# Patient Record
(demographics unavailable — no encounter records)

---

## 2025-07-03 NOTE — HISTORY OF PRESENT ILLNESS
[TextBox_4] : MARIA T  is here for an initial consultation.  He was born at term after an unassisted conception and uneventful pregnancy.  Hydronephrosis was detected in utero at 20 weeks and remained stable through the rest of the pregnancy.  No other anomalies noted and the amniotic fluid levels were normal.  Post partum he has been well without any issues feeding or voiding.  No fevers or UTIs.   A renal ultrasound at birth was not obtained.   In addition, a deformity of the penis was detected in the nursery which prevented circumcision as . Making ample wet diapers.  No infections.

## 2025-07-03 NOTE — CONSULT LETTER
[FreeTextEntry1] : Dear Dr. COLLINS MARROQUIN ,  I had the pleasure of consulting on ASHLEETOMMY RIGGS today.  Below is my note regarding the office visit today.  Thank you so very much for allowing me to participate in ASHLEETOMMY's  care.  Please don't hesitate to call me should any questions or issues arise .  Sincerely,   Kwesi Price MD, FACS, [facility] Chief, Pediatric Urology Professor of Urology and Pediatrics [employer]  President, [employer] - [place] Section Past-President, Societies for Pediatric Urology

## 2025-07-03 NOTE — ASSESSMENT
[FreeTextEntry1] : MARIA T has bilateral hydronephrosis. I explained the condition, its possible causes and implications.  We discussed the evaluation and possible management strategies.  Imaging in this case includes a sonogram, which was done and a VCUG.  I described the VCUG test. I answered all questions. We will reconvene after the study.  I also discussed the importance of being on antibiotics during the VCUG to avert infection.  MARIA T has phimosis; no abnormalities were noted today.  We discussed phimosis and its implications,  We then discussed the management options, including monitoring, use of steroids, and circumcision. The risks and benefits and possible complications of each option (including but not limited to reaction to steroids, bleeding, injury, incomplete circumcision and need for additional injury) was discussed and all questions were answered.  The decision for in office circumcision was made.  We performed the procedure using a Plastibell that needs to fall off spontaneously or in the office if needed.  I reiterated the anticipated post-circumcision course and instructions.  All questions were answered

## 2025-07-03 NOTE — PROCEDURE
[FreeTextEntry1] : Procedure:  In-office Circumcision by Plastibell   Consent signed   Circumcision performed with Plastibell 1.7 No bleeding and well tolerated   Checked again for bleeding before family left   Discharge instructions were provided including the need to call if the Plastibell does not fall off by 7 days   All questions answered

## 2025-07-03 NOTE — DATA REVIEWED
[FreeTextEntry1] : EXAMINATION: US RENAL AND PELVIS TODAY IN OFFICE     FINDINGS: RIGHT GRADE 1 AND LEFT GRADE 1-2 HYDRONEPHROSIS OTHERWISE UNREMARKABLE KIDNEY AND PELVIC STRUCTURES.

## 2025-07-03 NOTE — CONSULT LETTER
[FreeTextEntry1] : Dear Dr. COLLINS MARROQUIN ,  I had the pleasure of consulting on ASHLEETOMMY RIGGS today.  Below is my note regarding the office visit today.  Thank you so very much for allowing me to participate in ASHLEETOMMY's  care.  Please don't hesitate to call me should any questions or issues arise .  Sincerely,   Kwesi Price MD, FACS, [facility] Chief, Pediatric Urology Professor of Urology and Pediatrics [employer]  President, [employer] - [place] Section Past-President, Societies for Pediatric Urology

## 2025-07-03 NOTE — PHYSICAL EXAM
[TextBox_92] : PENIS: Straight uncircumcised penis with phimosis. Meatus not visible.  SCROTUM: Bilaterally symmetric testes in dependent position without palpable mass, hernia or hydrocele

## 2025-07-03 NOTE — CONSULT LETTER
Writer  read the order for labs. She was due for labs in July and never had them done.   [FreeTextEntry1] : Dear Dr. COLLINS MARROQUIN ,  I had the pleasure of consulting on ASHLEETOMMY RIGGS today.  Below is my note regarding the office visit today.  Thank you so very much for allowing me to participate in ASHLEETOMMY's  care.  Please don't hesitate to call me should any questions or issues arise .  Sincerely,   Kwesi Price MD, FACS, [facility] Chief, Pediatric Urology Professor of Urology and Pediatrics [employer]  President, [employer] - [place] Section Past-President, Societies for Pediatric Urology